# Patient Record
Sex: FEMALE | Race: WHITE | NOT HISPANIC OR LATINO | URBAN - METROPOLITAN AREA
[De-identification: names, ages, dates, MRNs, and addresses within clinical notes are randomized per-mention and may not be internally consistent; named-entity substitution may affect disease eponyms.]

---

## 2020-07-25 ENCOUNTER — TELEPHONE ENCOUNTER (OUTPATIENT)
Dept: URBAN - METROPOLITAN AREA CLINIC 13 | Facility: CLINIC | Age: 65
End: 2020-07-25

## 2020-07-25 RX ORDER — POLYETHYLENE GLYCOL 3350, SODIUM SULFATE, SODIUM CHLORIDE, POTASSIUM CHLORIDE, ASCORBIC ACID, SODIUM ASCORBATE 7.5-2.691G
USE AS DIRECTED KIT ORAL
Qty: 1 | Refills: 0 | OUTPATIENT
Start: 2012-06-18 | End: 2012-08-07

## 2020-07-25 RX ORDER — RISEDRONATE SODIUM 129; 21 MG/1; MG/1
TABLET, FILM COATED ORAL
Qty: 3 | Refills: 0 | OUTPATIENT
Start: 2012-04-27 | End: 2015-11-04

## 2020-07-25 RX ORDER — OMEPRAZOLE 40 MG/1
CAPSULE, DELAYED RELEASE ORAL
Qty: 90 | Refills: 0 | OUTPATIENT
Start: 2012-04-27 | End: 2015-11-04

## 2020-07-25 RX ORDER — NITAZOXANIDE 500 MG/1
TAKE 1 TABLET TWICE DAILY TABLET ORAL
Qty: 14 | Refills: 0 | OUTPATIENT
Start: 2015-11-04 | End: 2016-01-25

## 2020-07-25 RX ORDER — CITALOPRAM 20 MG/1
TAKE 1 TABLET DAILY TABLET ORAL
Qty: 30 | Refills: 3 | OUTPATIENT
Start: 2012-08-07 | End: 2013-03-01

## 2020-07-26 ENCOUNTER — TELEPHONE ENCOUNTER (OUTPATIENT)
Dept: URBAN - METROPOLITAN AREA CLINIC 13 | Facility: CLINIC | Age: 65
End: 2020-07-26

## 2020-07-26 RX ORDER — CITALOPRAM 20 MG/1
TAKE 1 TABLET BY MOUTH EVERY DAY TABLET ORAL
Qty: 30 | Refills: 0 | Status: ACTIVE | COMMUNITY
Start: 2015-11-04

## 2020-07-26 RX ORDER — CLOTRIMAZOLE AND BETAMETHASONE DIPROPIONATE 10; .5 MG/G; MG/G
CREAM TOPICAL
Qty: 45 | Refills: 0 | Status: ACTIVE | COMMUNITY
Start: 2012-05-16

## 2020-07-26 RX ORDER — SIMVASTATIN 40 MG/1
TAKE 1 TABLET DAILY IN THE EVENING TABLET, FILM COATED ORAL
Refills: 0 | Status: ACTIVE | COMMUNITY
Start: 2011-05-31

## 2020-07-26 RX ORDER — DICYCLOMINE HYDROCHLORIDE 10 MG/1
TAKE 1 CAPSULE EVERY 6 HOURS PRN ABDOMINAL PAIN CAPSULE ORAL
Qty: 40 | Refills: 1 | Status: ACTIVE | COMMUNITY
Start: 2016-01-25

## 2020-07-26 RX ORDER — MELOXICAM 15 MG/1
TAKE 1 TABLET DAILY TABLET ORAL
Refills: 0 | Status: ACTIVE | COMMUNITY
Start: 2012-06-09

## 2020-07-26 RX ORDER — LISINOPRIL AND HYDROCHLOROTHIAZIDE TABLETS 20; 12.5 MG/1; MG/1
TABLET ORAL
Qty: 90 | Refills: 0 | Status: ACTIVE | COMMUNITY
Start: 2011-05-31

## 2020-07-26 RX ORDER — OLMESARTAN MEDOXOMIL / AMLODIPINE BESYLATE / HYDROCHLOROTHIAZIDE 5; 25; 40 MG/1; MG/1; MG/1
TAKE 1 TABLET DAILY TABLET, FILM COATED ORAL
Qty: 90 | Refills: 0 | Status: ACTIVE | COMMUNITY
Start: 2012-04-27

## 2020-07-26 RX ORDER — SIMVASTATIN 20 MG/1
TABLET, FILM COATED ORAL
Qty: 90 | Refills: 0 | Status: ACTIVE | COMMUNITY
Start: 2012-04-27

## 2020-07-26 RX ORDER — HYDROCODONE BITARTRATE AND ACETAMINOPHEN 5; 325 MG/1; MG/1
TABLET ORAL
Qty: 30 | Refills: 0 | Status: ACTIVE | COMMUNITY
Start: 2013-01-08

## 2020-07-26 RX ORDER — PANTOPRAZOLE SODIUM 40 MG
TAKE 1 TABLET DAILY TABLET, DELAYED RELEASE (ENTERIC COATED) ORAL
Qty: 1 | Refills: 3 | Status: ACTIVE | COMMUNITY

## 2022-10-21 ENCOUNTER — OFFICE VISIT (OUTPATIENT)
Dept: URBAN - METROPOLITAN AREA CLINIC 107 | Facility: CLINIC | Age: 67
End: 2022-10-21

## 2022-10-28 ENCOUNTER — CLAIMS CREATED FROM THE CLAIM WINDOW (OUTPATIENT)
Dept: URBAN - METROPOLITAN AREA CLINIC 113 | Facility: CLINIC | Age: 67
End: 2022-10-28
Payer: MEDICARE

## 2022-10-28 ENCOUNTER — WEB ENCOUNTER (OUTPATIENT)
Dept: URBAN - METROPOLITAN AREA CLINIC 113 | Facility: CLINIC | Age: 67
End: 2022-10-28

## 2022-10-28 ENCOUNTER — LAB OUTSIDE AN ENCOUNTER (OUTPATIENT)
Dept: URBAN - METROPOLITAN AREA CLINIC 113 | Facility: CLINIC | Age: 67
End: 2022-10-28

## 2022-10-28 ENCOUNTER — TELEPHONE ENCOUNTER (OUTPATIENT)
Dept: URBAN - METROPOLITAN AREA CLINIC 113 | Facility: CLINIC | Age: 67
End: 2022-10-28

## 2022-10-28 VITALS
WEIGHT: 209 LBS | DIASTOLIC BLOOD PRESSURE: 69 MMHG | RESPIRATION RATE: 20 BRPM | TEMPERATURE: 97.3 F | BODY MASS INDEX: 37.03 KG/M2 | HEART RATE: 60 BPM | HEIGHT: 63 IN | SYSTOLIC BLOOD PRESSURE: 132 MMHG

## 2022-10-28 DIAGNOSIS — Z79.01 ANTICOAGULANT LONG-TERM USE: ICD-10-CM

## 2022-10-28 DIAGNOSIS — Z86.010 HISTORY OF COLON POLYPS: ICD-10-CM

## 2022-10-28 DIAGNOSIS — K58.0 IRRITABLE BOWEL SYNDROME WITH DIARRHEA: ICD-10-CM

## 2022-10-28 PROCEDURE — 99244 OFF/OP CNSLTJ NEW/EST MOD 40: CPT

## 2022-10-28 PROCEDURE — 99204 OFFICE O/P NEW MOD 45 MIN: CPT

## 2022-10-28 RX ORDER — SIMVASTATIN 20 MG/1
TABLET, FILM COATED ORAL
Qty: 90 | Refills: 0 | Status: ACTIVE | COMMUNITY
Start: 2012-04-27

## 2022-10-28 RX ORDER — CLOTRIMAZOLE AND BETAMETHASONE DIPROPIONATE 10; .5 MG/G; MG/G
CREAM TOPICAL
Qty: 45 | Refills: 0 | Status: ACTIVE | COMMUNITY
Start: 2012-05-16

## 2022-10-28 RX ORDER — MELOXICAM 15 MG/1
TAKE 1 TABLET DAILY TABLET ORAL
Refills: 0 | Status: ACTIVE | COMMUNITY
Start: 2012-06-09

## 2022-10-28 RX ORDER — DICYCLOMINE HYDROCHLORIDE 10 MG/1
TAKE 1 CAPSULE EVERY 6 HOURS PRN ABDOMINAL PAIN CAPSULE ORAL
Qty: 40 | Refills: 1 | Status: ACTIVE | COMMUNITY
Start: 2016-01-25

## 2022-10-28 RX ORDER — RIVAROXABAN 10 MG/1
1 TABLET TABLET, FILM COATED ORAL ONCE A DAY
Status: ACTIVE | COMMUNITY

## 2022-10-28 RX ORDER — OLMESARTAN MEDOXOMIL / AMLODIPINE BESYLATE / HYDROCHLOROTHIAZIDE 5; 25; 40 MG/1; MG/1; MG/1
TAKE 1 TABLET DAILY TABLET, FILM COATED ORAL
Qty: 90 | Refills: 0 | Status: ACTIVE | COMMUNITY
Start: 2012-04-27

## 2022-10-28 RX ORDER — CITALOPRAM 20 MG/1
TAKE 1 TABLET BY MOUTH EVERY DAY TABLET ORAL
Qty: 30 | Refills: 0 | Status: ACTIVE | COMMUNITY
Start: 2015-11-04

## 2022-10-28 RX ORDER — POLYETHYLENE GLYCOL 3350, SODIUM CHLORIDE, SODIUM BICARBONATE, POTASSIUM CHLORIDE 420; 11.2; 5.72; 1.48 G/4L; G/4L; G/4L; G/4L
1/2 PREP @ 5PM THE DAY BEFORE AND 1/2 PREP IS 6 H RS PRIOR TO PROCEDURE POWDER, FOR SOLUTION ORAL
Qty: 420 GRAM | Refills: 0 | OUTPATIENT
Start: 2022-10-28 | End: 2022-11-26

## 2022-10-28 RX ORDER — PANTOPRAZOLE SODIUM 40 MG
TAKE 1 TABLET DAILY TABLET, DELAYED RELEASE (ENTERIC COATED) ORAL
Qty: 1 | Refills: 3 | Status: ACTIVE | COMMUNITY

## 2022-10-28 RX ORDER — LISINOPRIL AND HYDROCHLOROTHIAZIDE TABLETS 20; 12.5 MG/1; MG/1
TABLET ORAL
Qty: 90 | Refills: 0 | Status: ACTIVE | COMMUNITY
Start: 2011-05-31

## 2022-10-28 RX ORDER — SIMVASTATIN 40 MG/1
TAKE 1 TABLET DAILY IN THE EVENING TABLET, FILM COATED ORAL
Refills: 0 | Status: ACTIVE | COMMUNITY
Start: 2011-05-31

## 2022-10-28 RX ORDER — HYDROCODONE BITARTRATE AND ACETAMINOPHEN 5; 325 MG/1; MG/1
TABLET ORAL
Qty: 30 | Refills: 0 | Status: ACTIVE | COMMUNITY
Start: 2013-01-08

## 2022-10-31 PROBLEM — 711150003: Status: ACTIVE | Noted: 2022-10-31

## 2022-10-31 PROBLEM — 428283002: Status: ACTIVE | Noted: 2022-10-28

## 2022-10-31 PROBLEM — 282825002: Status: ACTIVE | Noted: 2022-10-31

## 2022-10-31 PROBLEM — 197125005: Status: ACTIVE | Noted: 2022-10-28

## 2024-01-10 ENCOUNTER — LAB OUTSIDE AN ENCOUNTER (OUTPATIENT)
Dept: URBAN - METROPOLITAN AREA CLINIC 113 | Facility: CLINIC | Age: 69
End: 2024-01-10

## 2024-01-10 ENCOUNTER — OFFICE VISIT (OUTPATIENT)
Dept: URBAN - METROPOLITAN AREA CLINIC 113 | Facility: CLINIC | Age: 69
End: 2024-01-10
Payer: MEDICARE

## 2024-01-10 ENCOUNTER — TELEPHONE ENCOUNTER (OUTPATIENT)
Dept: URBAN - METROPOLITAN AREA CLINIC 113 | Facility: CLINIC | Age: 69
End: 2024-01-10

## 2024-01-10 ENCOUNTER — DASHBOARD ENCOUNTERS (OUTPATIENT)
Age: 69
End: 2024-01-10

## 2024-01-10 VITALS
DIASTOLIC BLOOD PRESSURE: 76 MMHG | HEIGHT: 63 IN | RESPIRATION RATE: 20 BRPM | HEART RATE: 56 BPM | WEIGHT: 168 LBS | BODY MASS INDEX: 29.77 KG/M2 | TEMPERATURE: 97.8 F | SYSTOLIC BLOOD PRESSURE: 155 MMHG

## 2024-01-10 DIAGNOSIS — Z86.010 HISTORY OF COLON POLYPS: ICD-10-CM

## 2024-01-10 DIAGNOSIS — K58.0 IRRITABLE BOWEL SYNDROME WITH DIARRHEA: ICD-10-CM

## 2024-01-10 DIAGNOSIS — Z12.11 COLON CANCER SCREENING: ICD-10-CM

## 2024-01-10 DIAGNOSIS — Z79.01 ANTICOAGULANT LONG-TERM USE: ICD-10-CM

## 2024-01-10 PROCEDURE — 99213 OFFICE O/P EST LOW 20 MIN: CPT | Performed by: INTERNAL MEDICINE

## 2024-01-10 RX ORDER — MELOXICAM 15 MG/1
TAKE 1 TABLET DAILY TABLET ORAL
Refills: 0 | Status: ACTIVE | COMMUNITY
Start: 2012-06-09

## 2024-01-10 RX ORDER — DICYCLOMINE HYDROCHLORIDE 10 MG/1
TAKE 1 CAPSULE EVERY 6 HOURS PRN ABDOMINAL PAIN CAPSULE ORAL
Qty: 40 | Refills: 1 | Status: ON HOLD | COMMUNITY
Start: 2016-01-25

## 2024-01-10 RX ORDER — OLMESARTAN MEDOXOMIL / AMLODIPINE BESYLATE / HYDROCHLOROTHIAZIDE 5; 25; 40 MG/1; MG/1; MG/1
TAKE 1 TABLET DAILY TABLET, FILM COATED ORAL
Qty: 90 | Refills: 0 | Status: ACTIVE | COMMUNITY
Start: 2012-04-27

## 2024-01-10 RX ORDER — PANTOPRAZOLE SODIUM 40 MG
TAKE 1 TABLET DAILY TABLET, DELAYED RELEASE (ENTERIC COATED) ORAL
Qty: 1 | Refills: 3 | Status: ACTIVE | COMMUNITY

## 2024-01-10 RX ORDER — POLYETHYLENE GLYCOL 3350, SODIUM CHLORIDE, SODIUM BICARBONATE, POTASSIUM CHLORIDE 420; 11.2; 5.72; 1.48 G/4L; G/4L; G/4L; G/4L
420 GM POWDER, FOR SOLUTION ORAL ONCE
Qty: 1 | Refills: 0 | OUTPATIENT
Start: 2024-01-10 | End: 2024-01-11

## 2024-01-10 RX ORDER — CLOTRIMAZOLE AND BETAMETHASONE DIPROPIONATE 10; .5 MG/G; MG/G
CREAM TOPICAL
Qty: 45 | Refills: 0 | Status: ON HOLD | COMMUNITY
Start: 2012-05-16

## 2024-01-10 RX ORDER — CITALOPRAM 20 MG/1
TAKE 1 TABLET BY MOUTH EVERY DAY TABLET ORAL
Qty: 30 | Refills: 0 | Status: ACTIVE | COMMUNITY
Start: 2015-11-04

## 2024-01-10 RX ORDER — LISINOPRIL AND HYDROCHLOROTHIAZIDE TABLETS 20; 12.5 MG/1; MG/1
TABLET ORAL
Qty: 90 | Refills: 0 | Status: ACTIVE | COMMUNITY
Start: 2011-05-31

## 2024-01-10 RX ORDER — SIMVASTATIN 20 MG/1
TABLET, FILM COATED ORAL
Qty: 90 | Refills: 0 | Status: ACTIVE | COMMUNITY
Start: 2012-04-27

## 2024-01-10 RX ORDER — RIVAROXABAN 10 MG/1
1 TABLET TABLET, FILM COATED ORAL ONCE A DAY
Status: ACTIVE | COMMUNITY

## 2024-01-10 RX ORDER — HYDROCODONE BITARTRATE AND ACETAMINOPHEN 5; 325 MG/1; MG/1
TABLET ORAL
Qty: 30 | Refills: 0 | Status: ON HOLD | COMMUNITY
Start: 2013-01-08

## 2024-01-10 NOTE — HPI-TODAY'S VISIT:
Ms. Clarke is a 67-year woman with a history of a fib s/p RF ablation/watchman, hepatic steatosis, IBS-D, GERD, arthritis, HTN, HLD, osteopenia, NEEL, anxiety/depression referred by Dr. Luis Khan for surveillance colonoscopy.  Last colonoscopy (7/12/12): 1 (6 mm) sessile polyp, otherwise unremarkable. Random biopsies were negative.  EGD (8/28/12): examined esophagus was normal, striped moderately erythematous mucosa with no bleeding in gastric antrum, examined duodenum was normal. Pathology revealed focally active chronic gastritis with reactive features suggestive of erosive/chemical gastropathy  Patient reports longstanding history of IBS-D. She has 5-6 bowel movements a day without blood, mucous, straining. She takes Imodium PRN.  She is currently on Xarelto managed by Dr. Randy Cole for a fib. She did have a watchman placed but per patient she still has a clot sitting in her atrial appendage. She has been treated with multiple therapies for her IBS-D but never tried Xifaxan.  She takes Protonix with good control of her GERD symptoms. Denies dysphagia, reflux, nausea, vomiting. Interval history, 1/10/2024.  Referring records were reviewed.  Laboratory testing from July 31, 2023 revealed a normal CBC and normal CMP, normal B12, normal hemoglobin A1c at 5.7. The patient states that she did not return for her colonoscopy due to health issues with her .  She states that her health has been excellent over the last year.  She states she has been taken off Xarelto since she has completed her Watchman procedure.  She has no gastrointestinal complaints at this time.

## 2024-02-28 ENCOUNTER — COLON (OUTPATIENT)
Dept: URBAN - METROPOLITAN AREA SURGERY CENTER 25 | Facility: SURGERY CENTER | Age: 69
End: 2024-02-28

## 2024-02-28 RX ORDER — LISINOPRIL AND HYDROCHLOROTHIAZIDE TABLETS 20; 12.5 MG/1; MG/1
TABLET ORAL
Qty: 90 | Refills: 0 | Status: ACTIVE | COMMUNITY
Start: 2011-05-31

## 2024-02-28 RX ORDER — CITALOPRAM 20 MG/1
TAKE 1 TABLET BY MOUTH EVERY DAY TABLET ORAL
Qty: 30 | Refills: 0 | Status: ACTIVE | COMMUNITY
Start: 2015-11-04

## 2024-02-28 RX ORDER — HYDROCODONE BITARTRATE AND ACETAMINOPHEN 5; 325 MG/1; MG/1
TABLET ORAL
Qty: 30 | Refills: 0 | Status: ON HOLD | COMMUNITY
Start: 2013-01-08

## 2024-02-28 RX ORDER — RIVAROXABAN 10 MG/1
1 TABLET TABLET, FILM COATED ORAL ONCE A DAY
Status: ACTIVE | COMMUNITY

## 2024-02-28 RX ORDER — MELOXICAM 15 MG/1
TAKE 1 TABLET DAILY TABLET ORAL
Refills: 0 | Status: ACTIVE | COMMUNITY
Start: 2012-06-09

## 2024-02-28 RX ORDER — DICYCLOMINE HYDROCHLORIDE 10 MG/1
TAKE 1 CAPSULE EVERY 6 HOURS PRN ABDOMINAL PAIN CAPSULE ORAL
Qty: 40 | Refills: 1 | Status: ON HOLD | COMMUNITY
Start: 2016-01-25

## 2024-02-28 RX ORDER — OLMESARTAN MEDOXOMIL / AMLODIPINE BESYLATE / HYDROCHLOROTHIAZIDE 5; 25; 40 MG/1; MG/1; MG/1
TAKE 1 TABLET DAILY TABLET, FILM COATED ORAL
Qty: 90 | Refills: 0 | Status: ACTIVE | COMMUNITY
Start: 2012-04-27

## 2024-02-28 RX ORDER — SIMVASTATIN 20 MG/1
TABLET, FILM COATED ORAL
Qty: 90 | Refills: 0 | Status: ACTIVE | COMMUNITY
Start: 2012-04-27

## 2024-02-28 RX ORDER — PANTOPRAZOLE SODIUM 40 MG
TAKE 1 TABLET DAILY TABLET, DELAYED RELEASE (ENTERIC COATED) ORAL
Qty: 1 | Refills: 3 | Status: ACTIVE | COMMUNITY

## 2024-02-28 RX ORDER — CLOTRIMAZOLE AND BETAMETHASONE DIPROPIONATE 10; .5 MG/G; MG/G
CREAM TOPICAL
Qty: 45 | Refills: 0 | Status: ON HOLD | COMMUNITY
Start: 2012-05-16

## 2024-05-28 NOTE — HPI-TODAY'S VISIT:
Ms. Clarke is a 67-year woman with a history of a fib s/p RF ablation/watchman, hepatic steatosis, IBS-D, GERD, arthritis, HTN, HLD, osteopenia, NEEL, anxiety/depression referred by Dr. Luis Khan for surveillance colonoscopy.  Last colonoscopy (7/12/12): 1 (6 mm) sessile polyp, otherwise unremarkable. Random biopsies were negative.  EGD (8/28/12): examined esophagus was normal, striped moderately erythematous mucosa with no bleeding in gastric antrum, examined duodenum was normal. Pathology revealed focally active chronic gastritis with reactive features suggestive of erosive/chemical gastropathy  Patient reports longstanding history of IBS-D. She has 5-6 bowel movements a day without blood, mucous, straining. She takes Imodium PRN.  She is currently on Xarelto managed by Dr. Randy Cole for a fib. She did have a watchman placed but per patient she still has a clot sitting in her atrial appendage. She has been treated with multiple therapies for her IBS-D but never tried Xifaxan.  She takes Protonix with good control of her GERD symptoms. Denies dysphagia, reflux, nausea, vomiting. This is a   here to establish care:        Review of systems completed and unremarkable other than what is documented in HPI.    Social history:  Medical history:  Medications:  SurgHx:  Fhx:  Allergies: